# Patient Record
Sex: MALE | Race: WHITE
[De-identification: names, ages, dates, MRNs, and addresses within clinical notes are randomized per-mention and may not be internally consistent; named-entity substitution may affect disease eponyms.]

---

## 2017-05-02 ENCOUNTER — HOSPITAL ENCOUNTER (OUTPATIENT)
Dept: HOSPITAL 47 - OR | Age: 62
Discharge: HOME | End: 2017-05-02
Payer: COMMERCIAL

## 2017-05-02 VITALS — SYSTOLIC BLOOD PRESSURE: 138 MMHG | DIASTOLIC BLOOD PRESSURE: 80 MMHG

## 2017-05-02 VITALS — BODY MASS INDEX: 27.5 KG/M2

## 2017-05-02 VITALS — HEART RATE: 64 BPM | RESPIRATION RATE: 16 BRPM

## 2017-05-02 VITALS — TEMPERATURE: 98.9 F

## 2017-05-02 DIAGNOSIS — F17.200: ICD-10-CM

## 2017-05-02 DIAGNOSIS — H26.9: Primary | ICD-10-CM

## 2017-05-02 DIAGNOSIS — Z95.5: ICD-10-CM

## 2017-05-02 DIAGNOSIS — Z88.0: ICD-10-CM

## 2017-05-02 DIAGNOSIS — I25.10: ICD-10-CM

## 2017-05-02 PROCEDURE — 66984 XCAPSL CTRC RMVL W/O ECP: CPT

## 2017-05-02 RX ADMIN — CYCLOPENTOLATE HYDROCHLORIDE ONE DROPS: 10 SOLUTION/ DROPS OPHTHALMIC at 12:32

## 2017-05-02 RX ADMIN — PHENYLEPHRINE HYDROCHLORIDE ONE DROPS: 100 SOLUTION/ DROPS OPHTHALMIC at 12:29

## 2017-05-02 RX ADMIN — CYCLOPENTOLATE HYDROCHLORIDE ONE DROPS: 10 SOLUTION/ DROPS OPHTHALMIC at 12:41

## 2017-05-02 RX ADMIN — CYCLOPENTOLATE HYDROCHLORIDE ONE DROPS: 10 SOLUTION/ DROPS OPHTHALMIC at 12:23

## 2017-05-02 RX ADMIN — PHENYLEPHRINE HYDROCHLORIDE ONE DROPS: 100 SOLUTION/ DROPS OPHTHALMIC at 12:38

## 2017-05-02 RX ADMIN — FLURBIPROFEN SODIUM ONE DROPS: 0.3 SOLUTION/ DROPS OPHTHALMIC at 12:35

## 2017-05-02 RX ADMIN — FLURBIPROFEN SODIUM ONE DROPS: 0.3 SOLUTION/ DROPS OPHTHALMIC at 12:26

## 2017-05-02 RX ADMIN — FLURBIPROFEN SODIUM ONE DROPS: 0.3 SOLUTION/ DROPS OPHTHALMIC at 12:17

## 2017-05-02 RX ADMIN — PHENYLEPHRINE HYDROCHLORIDE ONE DROPS: 100 SOLUTION/ DROPS OPHTHALMIC at 12:20

## 2017-05-02 NOTE — P.OP
Date of Procedure: 05/02/17


Procedure(s) Performed: 


PREOPERATIVE DIAGNOSIS:  Cataract, right eye.


POSTOPERATIVE DIAGNOSIS: Cataract, right eye.


OPERATION:     Phacoemulsification cataract, right eye. 





DESCRIPTION OF PROCEDURE:  The patient was taken to the preoperative holding 

area.  Intravenous Propofol was given so as to bring about adequate sedation.  

The following mixture was given for local anesthesia: 5 mL of 2% lidocaine, 5 

mL of 0.75% Marcaine, and 1 mL of Wydase.   Approximately 4 mL was injected in 

the retrobulbar space of the surgical eye.  Additional 1 mL was then directed 

to the temporal area of the surgical eye.  This was performed to allow adequate 

neurological block of the facial muscles.   The patient was revived and then 

taken into the operative room.  The patient was prepped and draped in the usual 

sterile manner for the operative eye.  A lid speculum was put into position.  

The conjunctiva was resected back from the limbus in the 12 o'clock position.  

Bleeding was controlled with electrocautery.  A #69 blade was then used and a 

half-thickness scleral incision approximately 1-mm posterior to the limbus was 

made on bare sclera.  This was shelved in the clear cornea using a crescent 

knife.  Next a 15-degree blade was used to make a stab incision at the 3 o'

clock position at the corneolimbal interface.   Keratome blade was then used 

and the superior wound was extended into the anterior chamber.  Viscoelastic 

was injected into the anterior chamber and to maintain its form. Next, a 

cystotome was used and a continuous anterior capsulotomy was made without 

difficulty.   Hydrodissection using a blunt cannula and BSS was performed.  

Phaco probe was then employed and a groove extending from 12 to 6 o'clock in 

the lens was created. A Gary wand was used through the stab incision so as to 

perform a divide and conquer technique.   Next an irrigation aspiration probe 

was utilized and any residual cortex was removed from the eye.   Again, 

viscoelastic was injected into the anterior chamber.   An Luigi posterior 

chamber lens implant was placed in the cartridge and injected into the anterior 

chamber without difficulty.  The Sinskey hook was utilized to spin the lens 

into position and this was again performed without any difficulty.  The 

irrigation and aspiration probe was again employed and any residual 

viscoelastic was removed from the eye.  Then BSS was injected into the limbal 

stab incision and the anterior chamber re-inflated.   The conjunctiva was 

reapproximated using electrocautery.  One drop of 0.25% Timoptic was placed 

over the corneal along with TobraDex ophthalmic ointment.   Two sterile patches 

and a Castrejon eye shield were taped into position.  The patient was transported to 

the recovery room in stable condition.  





Pathology: none sent


Condition: stable


Disposition: same day

## 2017-06-06 ENCOUNTER — HOSPITAL ENCOUNTER (OUTPATIENT)
Dept: HOSPITAL 47 - OR | Age: 62
Discharge: HOME | End: 2017-06-06
Payer: COMMERCIAL

## 2017-06-06 VITALS — SYSTOLIC BLOOD PRESSURE: 122 MMHG | HEART RATE: 67 BPM | DIASTOLIC BLOOD PRESSURE: 78 MMHG

## 2017-06-06 VITALS — TEMPERATURE: 97.6 F | RESPIRATION RATE: 18 BRPM

## 2017-06-06 VITALS — BODY MASS INDEX: 25.5 KG/M2

## 2017-06-06 DIAGNOSIS — Z88.0: ICD-10-CM

## 2017-06-06 DIAGNOSIS — I25.2: ICD-10-CM

## 2017-06-06 DIAGNOSIS — Z87.442: ICD-10-CM

## 2017-06-06 DIAGNOSIS — H25.12: Primary | ICD-10-CM

## 2017-06-06 DIAGNOSIS — Z79.82: ICD-10-CM

## 2017-06-06 DIAGNOSIS — F17.200: ICD-10-CM

## 2017-06-06 PROCEDURE — 66984 XCAPSL CTRC RMVL W/O ECP: CPT

## 2017-06-06 RX ADMIN — PHENYLEPHRINE HYDROCHLORIDE ONE DROPS: 100 SOLUTION/ DROPS OPHTHALMIC at 13:39

## 2017-06-06 RX ADMIN — CYCLOPENTOLATE HYDROCHLORIDE ONE DROPS: 10 SOLUTION/ DROPS OPHTHALMIC at 13:33

## 2017-06-06 RX ADMIN — CYCLOPENTOLATE HYDROCHLORIDE ONE DROPS: 10 SOLUTION/ DROPS OPHTHALMIC at 13:42

## 2017-06-06 RX ADMIN — FLURBIPROFEN SODIUM ONE DROPS: 0.3 SOLUTION/ DROPS OPHTHALMIC at 13:36

## 2017-06-06 RX ADMIN — FLURBIPROFEN SODIUM ONE DROPS: 0.3 SOLUTION/ DROPS OPHTHALMIC at 13:26

## 2017-06-06 RX ADMIN — PHENYLEPHRINE HYDROCHLORIDE ONE DROPS: 100 SOLUTION/ DROPS OPHTHALMIC at 13:30

## 2017-06-06 RX ADMIN — FLURBIPROFEN SODIUM ONE DROPS: 0.3 SOLUTION/ DROPS OPHTHALMIC at 13:45

## 2017-06-06 RX ADMIN — CYCLOPENTOLATE HYDROCHLORIDE ONE DROPS: 10 SOLUTION/ DROPS OPHTHALMIC at 13:22

## 2017-06-06 RX ADMIN — PHENYLEPHRINE HYDROCHLORIDE ONE DROPS: 100 SOLUTION/ DROPS OPHTHALMIC at 13:18

## 2017-06-06 NOTE — P.OP
Date of Procedure: 06/06/17


Preoperative Diagnosis: 





Postoperative Diagnosis: 





Procedure(s) Performed: 


PREOPERATIVE DIAGNOSIS:  Cataract, left eye.


POSTOPERATIVE DIAGNOSIS: Cataract, left eye.


OPERATION:     Phacoemulsification cataract, left eye. 





DESCRIPTION OF PROCEDURE:  The patient was taken to the preoperative holding 

area.  Intravenous Propofol was given so as to bring about adequate sedation.  

The following mixture was given for local anesthesia: 5 mL of 2% lidocaine, 5 

mL of 0.75% Marcaine, and 1 mL of Wydase.   Approximately 4 mL was injected in 

the retrobulbar space of the surgical eye.  Additional 1 mL was then directed 

to the temporal area of the surgical eye.  This was performed to allow adequate 

neurological block of the facial muscles.   The patient was revived and then 

taken into the operative room.  The patient was prepped and draped in the usual 

sterile manner for the operative eye.  A lid speculum was put into position.  

The conjunctiva was resected back from the limbus in the 12 o'clock position.  

Bleeding was controlled with electrocautery.  A #69 blade was then used and a 

half-thickness scleral incision approximately 1-mm posterior to the limbus was 

made on bare sclera.  This was shelved in the clear cornea using a crescent 

knife.  Next a 15-degree blade was used to make a stab incision at the 3 o'

clock position at the corneolimbal interface.   Keratome blade was then used 

and the superior wound was extended into the anterior chamber.  Viscoelastic 

was injected into the anterior chamber and to maintain its form. Next, a 

cystotome was used and a continuous anterior capsulotomy was made without 

difficulty.   Hydrodissection using a blunt cannula and BSS was performed.  

Phaco probe was then employed and a groove extending from 12 to 6 o'clock in 

the lens was created. A Gary wand was used through the stab incision so as to 

perform a divide and conquer technique.   Next an irrigation aspiration probe 

was utilized and any residual cortex was removed from the eye.   Again, 

viscoelastic was injected into the anterior chamber.   An Luigi posterior 

chamber lens implant was placed in the cartridge and injected into the anterior 

chamber without difficulty.  The Sprinklrey hook was utilized to spin the lens 

into position and this was again performed without any difficulty.  The 

irrigation and aspiration probe was again employed and any residual 

viscoelastic was removed from the eye.  Then BSS was injected into the limbal 

stab incision and the anterior chamber re-inflated.   The conjunctiva was 

reapproximated using electrocautery.  One drop of 0.25% Timoptic was placed 

over the corneal along with TobraDex ophthalmic ointment.   Two sterile patches 

and a Castrejon eye shield were taped into position.  The patient was transported to 

the recovery room in stable condition.  





Implants: 





Pathology: none sent


Condition: stable


Disposition: same day


Indications for Procedure: 





Operative Findings: 





Description of Procedure:

## 2017-12-29 ENCOUNTER — HOSPITAL ENCOUNTER (EMERGENCY)
Dept: HOSPITAL 47 - EC | Age: 62
Discharge: HOME | End: 2017-12-29
Payer: COMMERCIAL

## 2017-12-29 VITALS
HEART RATE: 77 BPM | TEMPERATURE: 97.6 F | RESPIRATION RATE: 16 BRPM | SYSTOLIC BLOOD PRESSURE: 128 MMHG | DIASTOLIC BLOOD PRESSURE: 68 MMHG

## 2017-12-29 DIAGNOSIS — R20.2: ICD-10-CM

## 2017-12-29 DIAGNOSIS — F17.200: ICD-10-CM

## 2017-12-29 DIAGNOSIS — R20.0: ICD-10-CM

## 2017-12-29 DIAGNOSIS — Z95.818: ICD-10-CM

## 2017-12-29 DIAGNOSIS — R12: ICD-10-CM

## 2017-12-29 DIAGNOSIS — R05: ICD-10-CM

## 2017-12-29 DIAGNOSIS — M54.2: ICD-10-CM

## 2017-12-29 DIAGNOSIS — M25.512: Primary | ICD-10-CM

## 2017-12-29 DIAGNOSIS — Z88.0: ICD-10-CM

## 2017-12-29 DIAGNOSIS — M47.812: ICD-10-CM

## 2017-12-29 PROCEDURE — 71020: CPT

## 2017-12-29 PROCEDURE — 99283 EMERGENCY DEPT VISIT LOW MDM: CPT

## 2017-12-29 PROCEDURE — 72050 X-RAY EXAM NECK SPINE 4/5VWS: CPT

## 2017-12-29 PROCEDURE — 93005 ELECTROCARDIOGRAM TRACING: CPT

## 2017-12-29 NOTE — XR
EXAMINATION TYPE: XR cervical spine comp

 

DATE OF EXAM: 12/29/2017

 

TECHNIQUE: Frontal, lateral, oblique, and open mouth view of the cervical spine are obtained.

 

HISTORY:  Pain

 

COMPARISON: None

 

FINDINGS:  The cervical spine is visualized from C1 thru the mid to inferior C7 level, it is straight
ened in alignment without evidence of acute fracture or dislocation. There is suboptimal evaluation o
f C7-T1 level without dedicated swimmer's view.  The pre-vertebral soft tissue appears within normal 
limits.  The C1-C2 articulation is within normal limits on the open mouth view. 

 

Vertebral body heights are maintained. There is mild disc space narrowing and spurring C4-C5 level. T
here is more moderate disc space narrowing with mild spurring C5-C6 level. There is mild to moderate 
disc space narrowing C6-C7 level. The oblique images are within normal limits. Overlying soft tissue 
is unremarkable.

 

IMPRESSION: Straightening of cervical spine with multilevel degenerative changes mid to lower cervica
l levels noted as detailed above.

## 2017-12-29 NOTE — XR
EXAMINATION TYPE: XR chest 2V

 

DATE OF EXAM: 12/29/2017

 

COMPARISON: 10/26/2015

 

HISTORY: Left shoulder and neck pain.

 

TECHNIQUE:  Frontal and lateral views of the chest are obtained.

 

FINDINGS:  There is no focal air space opacity, pleural effusion, or pneumothorax seen.  The cardiac 
silhouette size is within normal limits.   The osseous structures are intact.

 

IMPRESSION:  No acute cardiopulmonary process.

## 2017-12-29 NOTE — XR
EXAMINATION TYPE: XR shoulder complete LT

 

DATE OF EXAM: 12/29/2017

 

CLINICAL HISTORY: Left shoulder pain

 

TECHNIQUE:  Three views of the left shoulder are obtained.

 

COMPARISON: None. 

 

FINDINGS:  There is no acute fracture/dislocation evident in the left shoulder.  The acromioclavicula
r and glenohumeral joint spaces appear within normal limits.  The visualized ribs are intact and unre
markable.

 

IMPRESSION: Unremarkable study.

## 2017-12-29 NOTE — ED
General Adult HPI





- General


Chief complaint: Extremity Injury, Upper


Stated complaint: Shoulder tear


Time Seen by Provider: 12/29/17 11:27


Source: patient, RN notes reviewed


Mode of arrival: ambulatory


Limitations: no limitations





- History of Present Illness


Initial comments: 





Patient is a 62-year-old male who presents emergency room today with a chief 

complaint of pain to the left shoulder.  He does admit that he went to 

chiropractor advised them they may be something torn in the joint and should 

come here.  Patient states that started approximately a month ago has been a 

constant pain.  He does not that it's worse with movements of extension and 

rotation at times.  Also admits to pain when he rotates his neck to the right.  

Does not that he's had some numbness and tingling sensation down into the left 

arm at times.  Patient also admits that his had a burning sensation in his 

chest when he goes outside in the [place] under 30F.  States it goes away 

soon as he comes back in the house.  He does admit that he had cough congestion 

approximate month ago.  He denies any other complaints or symptoms at this 

time.  Denies any chest pain. Patient denies any recent fever, chills, 

shortness of breath, chest pain, back pain, abdominal pain, nausea or vomiting, 

dysuria or hematuria, constipation or diarrhea, headaches or visual changes, or 

any other complaints.





- Related Data


 Home Medications











 Medication  Instructions  Recorded  Confirmed


 


Ibuprofen [Motrin] 800 mg PO Q6HR PRN 12/29/17 12/29/17








 Previous Rx's











 Medication  Instructions  Recorded


 


Hydrocodone/Acetaminophen [Norco 1 each PO Q6HR PRN #12 tab 12/29/17





5-325]  


 


Ibuprofen [Motrin] 800 mg PO Q6HR #30 tab 12/29/17











 Allergies











Allergy/AdvReac Type Severity Reaction Status Date / Time


 


amoxicillin Allergy  Swelling Verified 12/29/17 11:09














Review of Systems


ROS Statement: 


Those systems with pertinent positive or pertinent negative responses have been 

documented in the HPI.





ROS Other: All systems not noted in ROS Statement are negative.





Past Medical History


Past Medical History: GERD/Reflux, Myocardial Infarction (MI)


Additional Past Medical History / Comment(s): Hx. of kidney stone.


Last Myocardial Infarction Date:: 2012


History of Any Multi-Drug Resistant Organisms: None Reported


Past Surgical History: Heart Catheterization With Stent


Additional Past Surgical History / Comment(s): rt cataract surgery


Past Anesthesia/Blood Transfusion Reactions: No Reported Reaction


Date of Last Stent Placement:: 2012


Past Psychological History: No Psychological Hx Reported


Smoking Status: Current every day smoker


Past Alcohol Use History: Occasional


Past Drug Use History: None Reported





- Past Family History


  ** Mother


Family Medical History: No Reported History





  ** Father


Family Medical History: Cancer


Additional Family Medical History / Comment(s): Lung





General Exam





- General Exam Comments


Initial Comments: 





General:  The patient is awake and alert, in no distress, and does not appear 

acutely ill. 


Eye:  Pupils are equal, round and reactive to light, extra-ocular movements are 

intact.  No nystagmus.  There is normal conjunctiva bilaterally.  No signs of 

icterus.  


Ears, nose, mouth and throat:  There are moist mucous membranes and no oral 

lesions. 


Neck:  The neck is supple, there is no tenderness or JVD.  


Cardiovascular:  There is a regular rate and rhythm. No murmur, rub or gallop 

is appreciated.


Respiratory:  Lungs are clear to auscultation, respirations are non-labored, 

breath sounds are equal.  No wheezes, stridor, rales, or rhonchi.


Musculoskeletal:  Normal ROM. .  Reproduced with rotation of his head to the 

right.  Mild tenderness to the paravertebral areas of the cervical spine on the 

left side.  Patient does have no bony tenderness to the right shoulder cervical

, thoracic or lumbar spine.  Strength 5/5. Sensation intact. Pulses equal 

bilaterally 2+.  


Neurological:  A&O x 3. CN II-XII intact, There are no obvious motor or sensory 

deficits. Coordination appears grossly intact. Speech is normal.


Skin:  Skin is warm and dry and no rashes or lesions are noted. 


Psychiatric:  Cooperative, appropriate mood & affect, normal judgment.  


Limitations: no limitations





Course


 Vital Signs











  12/29/17





  10:51


 


Temperature 97.4 F L


 


Pulse Rate 79


 


Respiratory 18





Rate 


 


Blood Pressure 134/84


 


O2 Sat by Pulse 99





Oximetry 














EKG Findings





- EKG Comments:


EKG Findings:: EKG performed at 1213: Shows evidence for right bundle branch 

block.  Shows normal sinus rhythm at 74 bpm IN interval 172.  .  QT/QTc 

is 406/450.  Her to previous EKG on 08/24/2013.





Medical Decision Making





- Medical Decision Making





Case discussed in detail with attending physician Dr. Alvarez.  Patient's EKG 

shows no acute change from previous.  X-rays have been reviewed and does show 

degenerative changes of cervical spine.  Patient's x-ray of the left shoulder 

and chest are negative.  Results were discussed with patient.  Patient has.  

Continue anti-inflammatories will be given a short prescription of pain 

medication use at night to help him sleep.  He is advised follow-up the family 

doctor and also a orthopedic for further evaluation.





Disposition


Clinical Impression: 


 Shoulder pain, Neck pain





Disposition: HOME SELF-CARE


Condition: Good


Instructions:  Cervical Radiculopathy (ED)


Additional Instructions: 


Please use medication as prescribed aware that the Norco may make you drowsy or 

cause constipation.  Please do not drive while taking this medication.  Please 

follow-up with orthopedics/family doctor in the next 2 days of symptoms have 

not improved.  Please return to emergency room if the symptoms increase or 

worsen or for any other concerns.


Prescriptions: 


Hydrocodone/Acetaminophen [Norco 5-325] 1 each PO Q6HR PRN #12 tab


 PRN Reason: Pain


Ibuprofen [Motrin] 800 mg PO Q6HR #30 tab


Referrals: 


None,Stated [Primary Care Provider] - 1-2 days


Ketty Chaney MD [STAFF PHYSICIAN] - 1-2 days


Mynor Nova MD [STAFF PHYSICIAN] - 1-2 days


Time of Disposition: 12:55

## 2018-02-01 ENCOUNTER — HOSPITAL ENCOUNTER (OUTPATIENT)
Dept: HOSPITAL 47 - RADMRIMAIN | Age: 63
Discharge: HOME | End: 2018-02-01
Payer: COMMERCIAL

## 2018-02-01 DIAGNOSIS — M43.12: ICD-10-CM

## 2018-02-01 DIAGNOSIS — M47.22: ICD-10-CM

## 2018-02-01 DIAGNOSIS — M50.13: ICD-10-CM

## 2018-02-01 DIAGNOSIS — M99.71: Primary | ICD-10-CM

## 2018-02-01 PROCEDURE — 72141 MRI NECK SPINE W/O DYE: CPT

## 2018-02-01 NOTE — MR
MRI CERVICAL SPINE:

 

CLINICAL HISTORY: Spondylosis without myelopathy or radiculopathy, cervical radiculopathy, cervical t
horacic scoliosis, and cervical spondylolisthesis all per order. Headache with neck pain causing left
 arm soreness for 2 months per patient.

 

TECHNIQUE: Multiplanar, multisequence imaging of the cervical spine is performed without IV contrast.


 

COMPARISON: Cervical spine x-ray December 29, 2017.

 

FINDINGS: Sagittal images of the cervical spine show the craniocervical junction to appear within nor
mal limits.  The cervical and upper thoracic spinal cord is normal in caliber and signal. There is sl
ight grade 1 retrolisthesis of C5 on C6. There is mild to moderate disc space narrowing C5-C6 level. 
 The vertebral body and intravertebral disk heights otherwise are normal. There is heterogeneous endp
late changes posterior C5-C6 level. No large posterior disc herniations are seen on sagittal images. 
No significant spurring is seen.

 

Axial images show the C2-C3, C3-C4, and C4-C5 levels all to appear within normal limits.

 

Axial images at C5-C6 levels show broad disc protrusion with right paracentral component effacing the
 anterior thecal sac and nearly up to ventral surface of spinal cord and causing moderate to advanced
 bilateral neural foraminal narrowing.

 

Axial images at C6-C7 level with broad-based posterior disc protrusion mildly effaces anterior thecal
 sac and causing advanced left and moderate right-sided neural foraminal narrowing.

 

Axial images at C7-T1 level shows small central disc protrusion mildly facing anterior thecal sac, bi
lateral neural foramina are patent.

 

IMPRESSION: Grade 1 retrolisthesis of C5 on C6. Degenerative changes with disc herniations at C5-C6 a
nd C6-C7 level causing bilateral neural foraminal narrowing. Disc herniation causes most prominent sp
inal canal effacement C5-C6 level.

## 2018-08-05 ENCOUNTER — HOSPITAL ENCOUNTER (EMERGENCY)
Dept: HOSPITAL 47 - EC | Age: 63
Discharge: HOME | End: 2018-08-05
Payer: COMMERCIAL

## 2018-08-05 VITALS — RESPIRATION RATE: 18 BRPM

## 2018-08-05 VITALS — DIASTOLIC BLOOD PRESSURE: 79 MMHG | TEMPERATURE: 98.5 F | SYSTOLIC BLOOD PRESSURE: 148 MMHG | HEART RATE: 58 BPM

## 2018-08-05 DIAGNOSIS — S80.02XA: Primary | ICD-10-CM

## 2018-08-05 DIAGNOSIS — M54.12: ICD-10-CM

## 2018-08-05 DIAGNOSIS — I25.2: ICD-10-CM

## 2018-08-05 DIAGNOSIS — Z95.5: ICD-10-CM

## 2018-08-05 DIAGNOSIS — Z79.84: ICD-10-CM

## 2018-08-05 DIAGNOSIS — I45.10: ICD-10-CM

## 2018-08-05 DIAGNOSIS — I25.10: ICD-10-CM

## 2018-08-05 DIAGNOSIS — R20.0: ICD-10-CM

## 2018-08-05 DIAGNOSIS — F17.200: ICD-10-CM
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
## 2018-08-05 DIAGNOSIS — K21.9: ICD-10-CM

## 2018-08-05 DIAGNOSIS — Z98.890: ICD-10-CM

## 2018-08-05 DIAGNOSIS — Z88.0: ICD-10-CM

## 2018-08-05 DIAGNOSIS — Z79.899: ICD-10-CM

## 2018-08-05 LAB
ALBUMIN SERPL-MCNC: 4.1 G/DL (ref 3.5–5)
ALP SERPL-CCNC: 112 U/L (ref 38–126)
ALT SERPL-CCNC: 54 U/L (ref 21–72)
ANION GAP SERPL CALC-SCNC: 10 MMOL/L
APTT BLD: 23.1 SEC (ref 22–30)
AST SERPL-CCNC: 28 U/L (ref 17–59)
BASOPHILS # BLD AUTO: 0.1 K/UL (ref 0–0.2)
BASOPHILS NFR BLD AUTO: 1 %
BUN SERPL-SCNC: 10 MG/DL (ref 9–20)
CALCIUM SPEC-MCNC: 9 MG/DL (ref 8.4–10.2)
CHLORIDE SERPL-SCNC: 111 MMOL/L (ref 98–107)
CK SERPL-CCNC: 95 U/L (ref 55–170)
CO2 SERPL-SCNC: 21 MMOL/L (ref 22–30)
EOSINOPHIL # BLD AUTO: 0.2 K/UL (ref 0–0.7)
EOSINOPHIL NFR BLD AUTO: 2 %
ERYTHROCYTE [DISTWIDTH] IN BLOOD BY AUTOMATED COUNT: 4.21 M/UL (ref 4.3–5.9)
ERYTHROCYTE [DISTWIDTH] IN BLOOD: 13.5 % (ref 11.5–15.5)
GLUCOSE SERPL-MCNC: 118 MG/DL (ref 74–99)
HCT VFR BLD AUTO: 39.8 % (ref 39–53)
HGB BLD-MCNC: 13.1 GM/DL (ref 13–17.5)
INR PPP: 1.1 (ref ?–1.2)
LYMPHOCYTES # SPEC AUTO: 2.5 K/UL (ref 1–4.8)
LYMPHOCYTES NFR SPEC AUTO: 29 %
MAGNESIUM SPEC-SCNC: 1.9 MG/DL (ref 1.6–2.3)
MCH RBC QN AUTO: 31.2 PG (ref 25–35)
MCHC RBC AUTO-ENTMCNC: 33 G/DL (ref 31–37)
MCV RBC AUTO: 94.4 FL (ref 80–100)
MONOCYTES # BLD AUTO: 0.6 K/UL (ref 0–1)
MONOCYTES NFR BLD AUTO: 7 %
NEUTROPHILS # BLD AUTO: 5 K/UL (ref 1.3–7.7)
NEUTROPHILS NFR BLD AUTO: 58 %
PLATELET # BLD AUTO: 206 K/UL (ref 150–450)
POTASSIUM SERPL-SCNC: 4.2 MMOL/L (ref 3.5–5.1)
PROT SERPL-MCNC: 6.6 G/DL (ref 6.3–8.2)
PT BLD: 10.4 SEC (ref 9–12)
SODIUM SERPL-SCNC: 142 MMOL/L (ref 137–145)
TROPONIN I SERPL-MCNC: <0.012 NG/ML (ref 0–0.03)
WBC # BLD AUTO: 8.6 K/UL (ref 3.8–10.6)

## 2018-08-05 PROCEDURE — 99285 EMERGENCY DEPT VISIT HI MDM: CPT

## 2018-08-05 PROCEDURE — 83735 ASSAY OF MAGNESIUM: CPT

## 2018-08-05 PROCEDURE — 71046 X-RAY EXAM CHEST 2 VIEWS: CPT

## 2018-08-05 PROCEDURE — 83880 ASSAY OF NATRIURETIC PEPTIDE: CPT

## 2018-08-05 PROCEDURE — 36415 COLL VENOUS BLD VENIPUNCTURE: CPT

## 2018-08-05 PROCEDURE — 82550 ASSAY OF CK (CPK): CPT

## 2018-08-05 PROCEDURE — 93005 ELECTROCARDIOGRAM TRACING: CPT

## 2018-08-05 PROCEDURE — 85730 THROMBOPLASTIN TIME PARTIAL: CPT

## 2018-08-05 PROCEDURE — 72125 CT NECK SPINE W/O DYE: CPT

## 2018-08-05 PROCEDURE — 85025 COMPLETE CBC W/AUTO DIFF WBC: CPT

## 2018-08-05 PROCEDURE — 80053 COMPREHEN METABOLIC PANEL: CPT

## 2018-08-05 PROCEDURE — 85610 PROTHROMBIN TIME: CPT

## 2018-08-05 PROCEDURE — 82553 CREATINE MB FRACTION: CPT

## 2018-08-05 PROCEDURE — 84484 ASSAY OF TROPONIN QUANT: CPT

## 2018-08-05 PROCEDURE — 70450 CT HEAD/BRAIN W/O DYE: CPT

## 2018-08-05 NOTE — US
EXAMINATION TYPE: US venous doppler duplex LE LT

 

DATE OF EXAM: 8/5/2018 1:59 PM

 

COMPARISON: NONE

 

CLINICAL HISTORY: 63-year-old male swelling and Pain.

 

SIDE PERFORMED: Left  

 

TECHNIQUE:  The lower extremity deep venous system is examined utilizing real time linear array sonog
gu[place] with graded compression, doppler sonography and color-flow sonography.

 

FINDINGS:

 

VESSELS IMAGED:

External Iliac Vein (EIV)

Common Femoral Vein

Deep Femoral Vein

Greater Saphenous Vein *

Femoral Vein

Popliteal Vein

Small Saphenous Vein *

Proximal Calf Veins

(* superficial vessels)

 

 

 

 

 

Left Leg:  Negative for DVT

 

 

 

IMPRESSION:

No evidence for DVT within the left lower extremity imaged from the groin to the upper calf.

## 2018-08-05 NOTE — XR
EXAMINATION TYPE: XR knee complete LT

 

DATE OF EXAM: 8/5/2018

 

COMPARISON: NONE

 

HISTORY: 63-year-old male left knee pain

 

TECHNIQUE: 3 views

 

FINDINGS: 

Extensor mechanism is intact. Trace, physiologic joint fluid. No acute fracture, subluxation, or disl
ocation. Mild anterior soft tissue swelling and

 

IMPRESSION: 

Mild anterior soft tissue swelling. No acute osseous abnormality seen.

## 2018-08-05 NOTE — CT
EXAMINATION TYPE: CT brain aleine wo con

 

DATE OF EXAM: 8/5/2018

 

COMPARISON: Previous MRI of the cervical spine dated 2/1/2018

 

HISTORY: Neck and Arm Pain, Radiculopathy from neck

 

CT DLP: 1349.90 mGycm

Automated exposure control for dose reduction was used.

 

TECHNIQUE: CT scan of the head and cervical spine are performed without contrast.

 

FINDINGS:  

BRAIN: There are mild, generalized changes of sulcal prominence and ventriculomegaly, compatible with
 atrophic change. There is mild, diffuse periventricular white matter lucency, compatible with chroni
c small vessel ischemic change. There is no acute focal lesion, mass effect or midline shift identifi
ed. I do not see evidence of intracranial blood.

 

There is an air-fluid level in the left maxillary sinus. The remainder the paranasal sinuses and mast
oids are clear. The bony calvarium is intact.

 

IMPRESSION:

1. NO ACUTE INTRACRANIAL ABNORMALITY.

2. DEGENERATIVE CHANGE.

3. ACUTE ON CHRONIC LEFT MAXILLARY SINUSITIS. CERVICAL SPINE: There are mild emphysematous changes wi
thin the lungs.

 

Prevertebral soft tissues are otherwise unremarkable.

 

There is a mild retrograde listhesis of C5 on C6. Alignment is otherwise maintained. Atlantoaxial rel
ationships are normal.

 

At C2-3, there is a diffuse disc displacement. Intervertebral foramina are widely maintained. The fac
et and uncovertebral joints are unremarkable.

 

At C3-4, there is a diffuse disc displacement. Intervertebral foramina are well maintained. The facet
 and uncovertebral joints are unremarkable.

 

C4-5, the intervertebral foramina are well maintained. There is a diffuse disc displacement. The face
t and uncovertebral joints are unremarkable.

 

At C5-6, there is bilateral intervertebral foraminal narrowing. There is disc space loss and hypertro
phic spondylosis both anteriorly and posteriorly. The facets are unremarkable. There is uncovertebral
 joint disease.

 

At C6-7, there is disc space loss and hypertrophic spondylosis both anteriorly and posteriorly. There
 is bilateral intervertebral foraminal narrowing. There is uncovertebral joint disease. The facets ar
e unremarkable.

 

At C7-T1, no definite abnormality is seen.

 

IMPRESSION:

1. DEGENERATIVE DISC DISEASE AND HYPERTROPHIC SPONDYLOSIS MOST MARKED AT C5-6 AND C6-7.

2. DIFFUSE DEGENERATIVE DISC DISEASE.

3. MULTILEVEL INTERVERTEBRAL FORAMINAL NARROWING.

## 2018-08-05 NOTE — ED
General Adult HPI





- General


Chief complaint: Neuro Symptoms/Deficit


Stated complaint: SOB


Time Seen by Provider: 08/05/18 13:08


Source: patient, EMS, RN notes reviewed


Mode of arrival: EMS


Limitations: no limitations





- History of Present Illness


Initial comments: 





63-year-old male presenting with left arm and shoulder pain as well as left 

knee pain and left lower extremity swelling.  Patient has history of CAD.  It 

was felt by EMS that his left shoulder pain may be cardiac in nature, he was 

given morphine and nitroglycerin prior to arrival.  He denies any chest pain or 

dyspnea.  He states that his neck and shoulder pain is been present for the 

past 6 months or greater.  Approximately 2 days ago patient did have a fall and 

he is developed some left knee pain.  He also reports left lower extremity 

swelling which is chronic in nature.  His primary care physician is aware of 

this swelling.  Patient denies fever or chills.  Denies cough.  Denies 

abdominal pain nausea vomiting.  Denies focal weakness.  He does complain of 

some numbness in the left arm which is also been present for the duration of 

his symptoms.  Patient's wife states he has been drinking quite heavily over 

the past several days and not eating.  She attributes this falls to alcohol 

consumption.  She states he fell onto his left knee twice.





- Related Data


 Home Medications











 Medication  Instructions  Recorded  Confirmed


 


Atorvastatin [Lipitor] 80 mg PO DAILY 08/05/18 08/05/18


 


Ibuprofen [Motrin] 800 mg PO TID PRN 08/05/18 08/05/18


 


Metoprolol Tartrate [Lopressor] 25 mg PO AS DIRECTED 08/05/18 08/05/18


 


Pioglitazone HCl [Actos] 15 mg PO AS DIRECTED 08/05/18 08/05/18


 


metFORMIN HCL [Glucophage] 500 mg PO DAILY 08/05/18 08/05/18











 Allergies











Allergy/AdvReac Type Severity Reaction Status Date / Time


 


amoxicillin Allergy  Swelling Verified 08/05/18 14:30














Review of Systems


ROS Statement: 


Those systems with pertinent positive or pertinent negative responses have been 

documented in the HPI.





ROS Other: All systems not noted in ROS Statement are negative.





Past Medical History


Past Medical History: GERD/Reflux, Myocardial Infarction (MI)


Additional Past Medical History / Comment(s): Hx. of kidney stone.


Last Myocardial Infarction Date:: 2012


History of Any Multi-Drug Resistant Organisms: None Reported


Past Surgical History: Heart Catheterization With Stent


Additional Past Surgical History / Comment(s): rt cataract surgery


Past Anesthesia/Blood Transfusion Reactions: No Reported Reaction


Date of Last Stent Placement:: 2012


Past Psychological History: No Psychological Hx Reported


Smoking Status: Current every day smoker


Past Alcohol Use History: Occasional


Past Drug Use History: None Reported





- Past Family History


  ** Mother


Family Medical History: No Reported History





  ** Father


Family Medical History: Cancer


Additional Family Medical History / Comment(s): Lung





General Exam


Limitations: no limitations


General appearance: alert, in no apparent distress


Head exam: Present: atraumatic, normocephalic


Eye exam: Present: normal appearance, PERRL, EOMI


ENT exam: Present: normal exam, normal oropharynx


Neck exam: Present: normal inspection, tenderness, full ROM


Respiratory exam: Present: normal lung sounds bilaterally.  Absent: respiratory 

distress, wheezes


Cardiovascular Exam: Present: regular rate, normal rhythm


GI/Abdominal exam: Present: soft.  Absent: distended, tenderness


Extremities exam: Present: normal inspection, normal capillary refill.  Absent: 

pedal edema


Back exam: Present: normal inspection, full ROM


Neurological exam: Present: alert, oriented X3, CN II-XII intact.  Absent: 

motor sensory deficit


Psychiatric exam: Present: normal affect, normal mood


Skin exam: Present: warm, dry, intact.  Absent: cyanosis, diaphoretic





Course


 Vital Signs











  08/05/18 08/05/18





  13:09 14:52


 


Temperature 99.5 F 


 


Pulse Rate 88 72


 


Respiratory 20 18





Rate  


 


Blood Pressure 132/75 140/77


 


O2 Sat by Pulse 99 100





Oximetry  














- Reevaluation(s)


Reevaluation #1: 





08/05/18 16:22


Patient is reevaluated, neurologic examination remains nonfocal, he is able to 

stand and bear weight without difficulty in the emergency department.





EKG Findings





- EKG Comments:


EKG Findings:: EKG: Normal sinus rhythm, right bundle branch block, rate of 70, 

FL interval 194, QRS duration 132,  no ST segment elevation or 

depression.





Medical Decision Making





- Medical Decision Making





63 -year-old male presenting for evaluation of chronic left upper extremity 

pain and numbness as well as left knee pain status post fall.  Patient didn't 

receive complete workup in emergency department.  EMS felt that his shoulder 

pain may be cardiac in nature, this was evaluated emergency department, EKG 

showed right bundle branch block, no ST segment changes, troponin is negative 

and as his symptoms have been present for months I doubt any cardiac cause for 

his left arm pain.  Patient does report neck arthritis.  CT is obtained which 

shows severe degenerative disease and foraminal narrowing.  This likely is the 

cause of patient's arm pain and numbness.  Again this is chronic in nature.  

Patient also fell onto his left knee twice, x-ray shows some soft tissue 

swelling without any fracture or dislocation.  Ultrasound is obtained for some 

swelling in the left leg which is negative for DVT.  Patient has follow-up with 

orthopedic surgery, C-spine and general disease.  He has Motrin and Norco at 

home.  He will attempt to abstain from alcohol use.





Patient is informed of all test results, patient wife are agreeable with 

discharge and outpatient follow-up.





- Lab Data


Result diagrams: 


 08/05/18 13:17





 08/05/18 13:17


 Lab Results











  08/05/18 08/05/18 08/05/18 Range/Units





  13:17 13:17 13:17 


 


WBC   8.6   (3.8-10.6)  k/uL


 


RBC   4.21 L   (4.30-5.90)  m/uL


 


Hgb   13.1   (13.0-17.5)  gm/dL


 


Hct   39.8   (39.0-53.0)  %


 


MCV   94.4   (80.0-100.0)  fL


 


MCH   31.2   (25.0-35.0)  pg


 


MCHC   33.0   (31.0-37.0)  g/dL


 


RDW   13.5   (11.5-15.5)  %


 


Plt Count   206   (150-450)  k/uL


 


Neutrophils %   58   %


 


Lymphocytes %   29   %


 


Monocytes %   7   %


 


Eosinophils %   2   %


 


Basophils %   1   %


 


Neutrophils #   5.0   (1.3-7.7)  k/uL


 


Lymphocytes #   2.5   (1.0-4.8)  k/uL


 


Monocytes #   0.6   (0-1.0)  k/uL


 


Eosinophils #   0.2   (0-0.7)  k/uL


 


Basophils #   0.1   (0-0.2)  k/uL


 


PT     (9.0-12.0)  sec


 


INR     (<1.2)  


 


APTT     (22.0-30.0)  sec


 


Sodium    142  (137-145)  mmol/L


 


Potassium    4.2  (3.5-5.1)  mmol/L


 


Chloride    111 H  ()  mmol/L


 


Carbon Dioxide    21 L  (22-30)  mmol/L


 


Anion Gap    10  mmol/L


 


BUN    10  (9-20)  mg/dL


 


Creatinine    0.80  (0.66-1.25)  mg/dL


 


Est GFR (CKD-EPI)AfAm    >90  (>60 ml/min/1.73 sqM)  


 


Est GFR (CKD-EPI)NonAf    >90  (>60 ml/min/1.73 sqM)  


 


Glucose    118 H  (74-99)  mg/dL


 


Calcium    9.0  (8.4-10.2)  mg/dL


 


Magnesium    1.9  (1.6-2.3)  mg/dL


 


Total Bilirubin    0.5  (0.2-1.3)  mg/dL


 


AST    28  (17-59)  U/L


 


ALT    54  (21-72)  U/L


 


Alkaline Phosphatase    112  ()  U/L


 


Total Creatine Kinase  95    ()  U/L


 


CK-MB (CK-2)  1.3    (0.0-2.4)  ng/mL


 


CK-MB (CK-2) Rel Index  1.4    


 


Troponin I  <0.012    (0.000-0.034)  ng/mL


 


NT-Pro-B Natriuret Pep     pg/mL


 


Total Protein    6.6  (6.3-8.2)  g/dL


 


Albumin    4.1  (3.5-5.0)  g/dL














  08/05/18 08/05/18 Range/Units





  13:17 13:17 


 


WBC    (3.8-10.6)  k/uL


 


RBC    (4.30-5.90)  m/uL


 


Hgb    (13.0-17.5)  gm/dL


 


Hct    (39.0-53.0)  %


 


MCV    (80.0-100.0)  fL


 


MCH    (25.0-35.0)  pg


 


MCHC    (31.0-37.0)  g/dL


 


RDW    (11.5-15.5)  %


 


Plt Count    (150-450)  k/uL


 


Neutrophils %    %


 


Lymphocytes %    %


 


Monocytes %    %


 


Eosinophils %    %


 


Basophils %    %


 


Neutrophils #    (1.3-7.7)  k/uL


 


Lymphocytes #    (1.0-4.8)  k/uL


 


Monocytes #    (0-1.0)  k/uL


 


Eosinophils #    (0-0.7)  k/uL


 


Basophils #    (0-0.2)  k/uL


 


PT   10.4  (9.0-12.0)  sec


 


INR   1.1  (<1.2)  


 


APTT   23.1  (22.0-30.0)  sec


 


Sodium    (137-145)  mmol/L


 


Potassium    (3.5-5.1)  mmol/L


 


Chloride    ()  mmol/L


 


Carbon Dioxide    (22-30)  mmol/L


 


Anion Gap    mmol/L


 


BUN    (9-20)  mg/dL


 


Creatinine    (0.66-1.25)  mg/dL


 


Est GFR (CKD-EPI)AfAm    (>60 ml/min/1.73 sqM)  


 


Est GFR (CKD-EPI)NonAf    (>60 ml/min/1.73 sqM)  


 


Glucose    (74-99)  mg/dL


 


Calcium    (8.4-10.2)  mg/dL


 


Magnesium    (1.6-2.3)  mg/dL


 


Total Bilirubin    (0.2-1.3)  mg/dL


 


AST    (17-59)  U/L


 


ALT    (21-72)  U/L


 


Alkaline Phosphatase    ()  U/L


 


Total Creatine Kinase    ()  U/L


 


CK-MB (CK-2)    (0.0-2.4)  ng/mL


 


CK-MB (CK-2) Rel Index    


 


Troponin I    (0.000-0.034)  ng/mL


 


NT-Pro-B Natriuret Pep  274   pg/mL


 


Total Protein    (6.3-8.2)  g/dL


 


Albumin    (3.5-5.0)  g/dL














Disposition


Clinical Impression: 


 Contusion, knee, Cervical radiculopathy





Disposition: HOME SELF-CARE


Condition: Good


Instructions:  Cervical Radiculopathy (ED), Knee Pain (ED)


Is patient prescribed a controlled substance at d/c from ED?: No


Referrals: 


Rik Hurt MD [Primary Care Provider] - 1-2 days


OSORIO Harp DO [Doctor of Osteopathic Medicine] - 1-2 days


Time of Disposition: 16:22

## 2018-08-05 NOTE — XR
EXAMINATION TYPE: XR chest 2V

 

DATE OF EXAM: 8/5/2018

 

HISTORY: Chest Pain.

 

REFERENCE: Previous study dated 12/29/2017.

 

FINDINGS: The lungs are clear. Pleural space are clear. Heart size is normal.

 

IMPRESSION: 

 

NORMAL CHEST.

## 2019-04-05 ENCOUNTER — HOSPITAL ENCOUNTER (INPATIENT)
Dept: HOSPITAL 47 - EC | Age: 64
Discharge: ANOTHER HOSPITAL | DRG: 215 | End: 2019-04-05
Attending: INTERNAL MEDICINE | Admitting: INTERNAL MEDICINE
Payer: COMMERCIAL

## 2019-04-05 VITALS — SYSTOLIC BLOOD PRESSURE: 104 MMHG | HEART RATE: 76 BPM | RESPIRATION RATE: 14 BRPM | DIASTOLIC BLOOD PRESSURE: 72 MMHG

## 2019-04-05 VITALS — TEMPERATURE: 97.7 F

## 2019-04-05 DIAGNOSIS — Z88.0: ICD-10-CM

## 2019-04-05 DIAGNOSIS — K21.9: ICD-10-CM

## 2019-04-05 DIAGNOSIS — I49.01: ICD-10-CM
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
## 2019-04-05 DIAGNOSIS — I45.10: ICD-10-CM

## 2019-04-05 DIAGNOSIS — I25.2: ICD-10-CM

## 2019-04-05 DIAGNOSIS — M50.30: ICD-10-CM

## 2019-04-05 DIAGNOSIS — I11.9: ICD-10-CM

## 2019-04-05 DIAGNOSIS — F17.210: ICD-10-CM

## 2019-04-05 DIAGNOSIS — G47.30: ICD-10-CM

## 2019-04-05 DIAGNOSIS — Z82.49: ICD-10-CM

## 2019-04-05 DIAGNOSIS — Z72.89: ICD-10-CM

## 2019-04-05 DIAGNOSIS — T82.855A: ICD-10-CM

## 2019-04-05 DIAGNOSIS — M19.90: ICD-10-CM

## 2019-04-05 DIAGNOSIS — I46.9: ICD-10-CM

## 2019-04-05 DIAGNOSIS — Z98.41: ICD-10-CM

## 2019-04-05 DIAGNOSIS — I21.4: Primary | ICD-10-CM

## 2019-04-05 DIAGNOSIS — Z79.82: ICD-10-CM

## 2019-04-05 DIAGNOSIS — E11.9: ICD-10-CM

## 2019-04-05 DIAGNOSIS — E78.5: ICD-10-CM

## 2019-04-05 DIAGNOSIS — E87.5: ICD-10-CM

## 2019-04-05 DIAGNOSIS — Z80.1: ICD-10-CM

## 2019-04-05 DIAGNOSIS — I44.2: ICD-10-CM

## 2019-04-05 DIAGNOSIS — Z96.1: ICD-10-CM

## 2019-04-05 DIAGNOSIS — Z87.442: ICD-10-CM

## 2019-04-05 DIAGNOSIS — Z71.41: ICD-10-CM

## 2019-04-05 DIAGNOSIS — Z79.899: ICD-10-CM

## 2019-04-05 DIAGNOSIS — Z91.19: ICD-10-CM

## 2019-04-05 DIAGNOSIS — I25.10: ICD-10-CM

## 2019-04-05 DIAGNOSIS — Z83.3: ICD-10-CM

## 2019-04-05 DIAGNOSIS — R13.10: ICD-10-CM

## 2019-04-05 DIAGNOSIS — Z79.84: ICD-10-CM

## 2019-04-05 DIAGNOSIS — R57.0: ICD-10-CM

## 2019-04-05 LAB
ALBUMIN SERPL-MCNC: 3.7 G/DL (ref 3.5–5)
ALBUMIN SERPL-MCNC: 4.3 G/DL (ref 3.5–5)
ALP SERPL-CCNC: 120 U/L (ref 38–126)
ALP SERPL-CCNC: 133 U/L (ref 38–126)
ALT SERPL-CCNC: 77 U/L (ref 21–72)
ALT SERPL-CCNC: 81 U/L (ref 21–72)
ANION GAP SERPL CALC-SCNC: 13 MMOL/L
ANION GAP SERPL CALC-SCNC: 22 MMOL/L
ANION GAP SERPL CALC-SCNC: 9 MMOL/L
APTT BLD: 22.9 SEC (ref 22–30)
AST SERPL-CCNC: 404 U/L (ref 17–59)
AST SERPL-CCNC: 452 U/L (ref 17–59)
BASOPHILS # BLD AUTO: 0.1 K/UL (ref 0–0.2)
BASOPHILS # BLD AUTO: 0.1 K/UL (ref 0–0.2)
BASOPHILS NFR BLD AUTO: 1 %
BASOPHILS NFR BLD AUTO: 1 %
BUN SERPL-SCNC: 16 MG/DL (ref 9–20)
BUN SERPL-SCNC: 19 MG/DL (ref 9–20)
BUN SERPL-SCNC: 20 MG/DL (ref 9–20)
CALCIUM SPEC-MCNC: 10.5 MG/DL (ref 8.4–10.2)
CALCIUM SPEC-MCNC: 10.7 MG/DL (ref 8.4–10.2)
CALCIUM SPEC-MCNC: 9.7 MG/DL (ref 8.4–10.2)
CHLORIDE SERPL-SCNC: 105 MMOL/L (ref 98–107)
CHLORIDE SERPL-SCNC: 106 MMOL/L (ref 98–107)
CHLORIDE SERPL-SCNC: 108 MMOL/L (ref 98–107)
CO2 SERPL-SCNC: 14 MMOL/L (ref 22–30)
CO2 SERPL-SCNC: 21 MMOL/L (ref 22–30)
CO2 SERPL-SCNC: 22 MMOL/L (ref 22–30)
EOSINOPHIL # BLD AUTO: 0.1 K/UL (ref 0–0.7)
EOSINOPHIL # BLD AUTO: 0.2 K/UL (ref 0–0.7)
EOSINOPHIL NFR BLD AUTO: 1 %
EOSINOPHIL NFR BLD AUTO: 1 %
ERYTHROCYTE [DISTWIDTH] IN BLOOD BY AUTOMATED COUNT: 3.9 M/UL (ref 4.3–5.9)
ERYTHROCYTE [DISTWIDTH] IN BLOOD BY AUTOMATED COUNT: 4.55 M/UL (ref 4.3–5.9)
ERYTHROCYTE [DISTWIDTH] IN BLOOD: 13.6 % (ref 11.5–15.5)
ERYTHROCYTE [DISTWIDTH] IN BLOOD: 14.1 % (ref 11.5–15.5)
GLUCOSE SERPL-MCNC: 161 MG/DL (ref 74–99)
GLUCOSE SERPL-MCNC: 163 MG/DL (ref 74–99)
GLUCOSE SERPL-MCNC: 379 MG/DL (ref 74–99)
HCT VFR BLD AUTO: 39.3 % (ref 39–53)
HCT VFR BLD AUTO: 42.1 % (ref 39–53)
HGB BLD-MCNC: 12.4 GM/DL (ref 13–17.5)
HGB BLD-MCNC: 14.4 GM/DL (ref 13–17.5)
INR PPP: 0.9 (ref ?–1.2)
LYMPHOCYTES # SPEC AUTO: 2.6 K/UL (ref 1–4.8)
LYMPHOCYTES # SPEC AUTO: 4.9 K/UL (ref 1–4.8)
LYMPHOCYTES NFR SPEC AUTO: 17 %
LYMPHOCYTES NFR SPEC AUTO: 28 %
MAGNESIUM SPEC-SCNC: 1.9 MG/DL (ref 1.6–2.3)
MCH RBC QN AUTO: 31.6 PG (ref 25–35)
MCH RBC QN AUTO: 31.7 PG (ref 25–35)
MCHC RBC AUTO-ENTMCNC: 31.4 G/DL (ref 31–37)
MCHC RBC AUTO-ENTMCNC: 34.1 G/DL (ref 31–37)
MCV RBC AUTO: 101 FL (ref 80–100)
MCV RBC AUTO: 92.6 FL (ref 80–100)
MONOCYTES # BLD AUTO: 1.2 K/UL (ref 0–1)
MONOCYTES # BLD AUTO: 1.4 K/UL (ref 0–1)
MONOCYTES NFR BLD AUTO: 8 %
MONOCYTES NFR BLD AUTO: 8 %
NEUTROPHILS # BLD AUTO: 10 K/UL (ref 1.3–7.7)
NEUTROPHILS # BLD AUTO: 11.3 K/UL (ref 1.3–7.7)
NEUTROPHILS NFR BLD AUTO: 58 %
NEUTROPHILS NFR BLD AUTO: 72 %
PLATELET # BLD AUTO: 149 K/UL (ref 150–450)
PLATELET # BLD AUTO: 249 K/UL (ref 150–450)
POTASSIUM SERPL-SCNC: 5 MMOL/L (ref 3.5–5.1)
POTASSIUM SERPL-SCNC: 5.8 MMOL/L (ref 3.5–5.1)
PROT SERPL-MCNC: 6.6 G/DL (ref 6.3–8.2)
PROT SERPL-MCNC: 7.4 G/DL (ref 6.3–8.2)
PT BLD: 9.9 SEC (ref 9–12)
SODIUM SERPL-SCNC: 139 MMOL/L (ref 137–145)
SODIUM SERPL-SCNC: 139 MMOL/L (ref 137–145)
SODIUM SERPL-SCNC: 142 MMOL/L (ref 137–145)
WBC # BLD AUTO: 15.7 K/UL (ref 3.8–10.6)
WBC # BLD AUTO: 17.2 K/UL (ref 3.8–10.6)

## 2019-04-05 PROCEDURE — 85025 COMPLETE CBC W/AUTO DIFF WBC: CPT

## 2019-04-05 PROCEDURE — 71046 X-RAY EXAM CHEST 2 VIEWS: CPT

## 2019-04-05 PROCEDURE — 92950 HEART/LUNG RESUSCITATION CPR: CPT

## 2019-04-05 PROCEDURE — 96375 TX/PRO/DX INJ NEW DRUG ADDON: CPT

## 2019-04-05 PROCEDURE — 80048 BASIC METABOLIC PNL TOTAL CA: CPT

## 2019-04-05 PROCEDURE — 93458 L HRT ARTERY/VENTRICLE ANGIO: CPT

## 2019-04-05 PROCEDURE — B41G1ZZ FLUOROSCOPY OF LEFT LOWER EXTREMITY ARTERIES USING LOW OSMOLAR CONTRAST: ICD-10-PCS

## 2019-04-05 PROCEDURE — 36415 COLL VENOUS BLD VENIPUNCTURE: CPT

## 2019-04-05 PROCEDURE — 5A0221D ASSISTANCE WITH CARDIAC OUTPUT USING IMPELLER PUMP, CONTINUOUS: ICD-10-PCS

## 2019-04-05 PROCEDURE — 5A1223Z PERFORMANCE OF CARDIAC PACING, CONTINUOUS: ICD-10-PCS

## 2019-04-05 PROCEDURE — 93005 ELECTROCARDIOGRAM TRACING: CPT

## 2019-04-05 PROCEDURE — 99291 CRITICAL CARE FIRST HOUR: CPT

## 2019-04-05 PROCEDURE — 80053 COMPREHEN METABOLIC PANEL: CPT

## 2019-04-05 PROCEDURE — 96376 TX/PRO/DX INJ SAME DRUG ADON: CPT

## 2019-04-05 PROCEDURE — 02HA3RZ INSERTION OF SHORT-TERM EXTERNAL HEART ASSIST SYSTEM INTO HEART, PERCUTANEOUS APPROACH: ICD-10-PCS

## 2019-04-05 PROCEDURE — 82330 ASSAY OF CALCIUM: CPT

## 2019-04-05 PROCEDURE — 84484 ASSAY OF TROPONIN QUANT: CPT

## 2019-04-05 PROCEDURE — B2111ZZ FLUOROSCOPY OF MULTIPLE CORONARY ARTERIES USING LOW OSMOLAR CONTRAST: ICD-10-PCS

## 2019-04-05 PROCEDURE — 94002 VENT MGMT INPAT INIT DAY: CPT

## 2019-04-05 PROCEDURE — 96366 THER/PROPH/DIAG IV INF ADDON: CPT

## 2019-04-05 PROCEDURE — 4A023N7 MEASUREMENT OF CARDIAC SAMPLING AND PRESSURE, LEFT HEART, PERCUTANEOUS APPROACH: ICD-10-PCS

## 2019-04-05 PROCEDURE — 85730 THROMBOPLASTIN TIME PARTIAL: CPT

## 2019-04-05 PROCEDURE — 96365 THER/PROPH/DIAG IV INF INIT: CPT

## 2019-04-05 PROCEDURE — 96361 HYDRATE IV INFUSION ADD-ON: CPT

## 2019-04-05 PROCEDURE — 85610 PROTHROMBIN TIME: CPT

## 2019-04-05 PROCEDURE — 33210 INSERT ELECTRD/PM CATH SNGL: CPT

## 2019-04-05 PROCEDURE — 83735 ASSAY OF MAGNESIUM: CPT

## 2019-04-05 NOTE — CC
CARDIAC CATHETERIZATION REPORT



DATE OF SERVICE:

04/05/2019



PERFORMING PHYSICIAN:

Jorge A Elder MD, interventional cardiologist.



PROCEDURE PERFORMED:

1. Selective right and left coronary angiogram.

2. Left heart catheterization.

3. Selective angiogram of bilateral common femoral arteries.

4. Successful placement of the interlock 2.5 from the left femoral approach.

5. Successful placement of transvenous temporary pacemaker from right common femoral

    vein approach.



INDICATION:

This is a 64-year-old gentleman with history of coronary artery disease and prior

stenting of the right coronary artery in 2013 as well as hypertension, dyslipidemia,

diabetes and a significant history of smoking as well as noncompliance, where the

patient did not have any followup since 2013. He presented to the emergency room at

[facility] with chest discomfort and ruled in for acute non-ST-

elevation myocardial infarction.  He was seen and evaluated by Dr. Avendaño, who

recommended proceeding with coronary angiogram and possible coronary revascularization.

Because of that, the patient was brought to the cardiac cath lab.



APPROACH:

Right common femoral artery and left common femoral artery.



COMPLICATIONS:

None.



LEVEL OF SEDATION:

Initially we did conscious sedation, but throughout the procedure and because of the

cardiac arrest, we did induce general anesthesia and the patient was intubated.



PROCEDURE DESCRIPTION:

After obtaining informed consent, the patient was brought to the cardiac cath lab.  The

right common femoral artery was cannulated using micropuncture technique. The

micropuncture wire passed easily. Then I placed a 6-Macedonian sheath 11 cm in the left

common femoral artery.



At that point, I did selective right and left coronary angiogram using JR4 and JL4

catheters.  Left heart catheterization was performed using the JR4 catheter, which

crossed the aortic valve.  Then I did flush the catheter and we did left ventricular

end-diastolic pressure through the JR4 catheter.  After that, I did pullback across

aortic valve.



Subsequently we reviewed the angiogram and decided to place Impella in the left

ventricle because the patient was in cardiogenic shock with a systolic pressure in the

80s and mean pressure in the 50s.



Please refer to the Impella description throughout this report.



SELECTIVE CORONARY ANGIOGRAM:

RIGHT CORONARY ARTERY: The right coronary artery is a large-caliber vessel and it is a

dominant vessel.  The RCA in the proximal portion appeared to have mild disease only.

The RCA in the mid portion is stented.  The RCA is chronically occluded in the mid

portion, which seems to be in-stent occlusion and fills by bridging collaterals from

the left coronary system.



LEFT MAIN: The left main is angiographically normal.  It bifurcates into left

circumflex and left anterior descending artery.



LEFT CIRCUMFLEX: The left circumflex is a large-caliber vessel.  It is a nondominant

vessel.  The proximal left circumflex appeared to be angiographically normal.  It gives

rise to the first and second obtuse marginal branches.  The first obtuse marginal

branch is a medium-caliber vessel with ostial lesion that appeared to be in the range

of 99.9%.  The second obtuse marginal branch has another ostial lesion that appeared to

be in the range of 99.9% and also is a small- to medium-caliber vessel.  The mid left

circumflex gives rise to the third OM branch, which is a large-caliber vessel which has

a lesion in the mid portion that appeared to be in the range of 99.9%.  Bridging

collaterals from the left to right were seen.



LEFT ANTERIOR DESCENDING: The proximal LAD appeared to be hazy with a lesion that

appeared to be in the range of 95% to 99%. The LAD in the proximal portion gives rise

to the first diagonal branch, which is a large-caliber diagonal with intermediate

disease in the proximal portion.  The mid LAD after the first diagonal has another

lesion that appeared to be in the range of 95% to 99%. The LAD distally appeared to

have another lesion that appeared to be in the range of 70% to 80%.



HEMODYNAMICS:

The left ventricular end-diastolic pressure was about 22 mmHg without any significant

gradient across the aortic valve.



LEFT VENTRICULAR IMPELLA PLACEMENT:

Before I did access the left common femoral vein, I did selective right common femoral

artery angiogram which showed disease where the sheath enters the right common femoral

artery.  Because of that, I decided to do an angiogram of the left common femoral

artery to assess the status of the left common femoral artery before I placed a 14-

Macedonian sheath in it. So I did select the left common femoral artery using an 0.035

Glidewire with a 5-Macedonian RIM catheter.  After that I advanced the RIM catheter over

the Glidewire to the left external iliac artery, and I did selective left common

femoral artery angiogram with injection through the RIM catheter.  That identified

normal diameter without any obstructive disease in the left common femoral artery.  At

that point, I decided to place the Impella through the left common femoral artery.



I did access the left common femoral artery using micropuncture technique. The

micropuncture wire passed easily. Then I advanced the micropuncture sheath to the left

common femoral artery.  Then I did selective left common femoral artery angiogram

through the micropuncture sheath to assess the sheath entry, which was very appropriate

in the mid of the femoral head.  At that point, I decided to upgrade to 14-Macedonian

sheath.



I did placed at that point a 6-Macedonian 11 cm sheath in the left common femoral artery.

After that I placed 2 Perclose devices at 10 o'clock and 2 o'clock in the left common

femoral artery as a _____. Subsequently I did upgrade my 6-Macedonian sheath to 14-Macedonian

sheath with predilatation using an 8-Macedonian dilator, 10-Macedonian dilator, and a 12-Macedonian

dilator.  That was placed over a stiff 0.035 wire.



At that point, we gave the patient 3000 units of heparin IV to achieve an ACT above 250

after the 14-Macedonian sheath was placed in the left common femoral artery.



After that I crossed the left ventricle using a 6-Macedonian pigtail catheter with an 0.035

Glidewire.  After that I advanced the 0.018 Impella wire through the 6-Macedonian pigtail

catheter.  I pulled the 6-Macedonian pigtail catheter and left the Impella wire in the left

ventricle.  After that I advanced the Impella to the left ventricle under fluoroscopic

guidance.  We started the Impella pump at that point.



CATH LAB COURSE:

Throughout the procedure, the patient initially went into asystole.  We gave the

patient 2 mg of epinephrine and the patient came back to normal sinus mechanism.  At

that point I did place transvenous temporary pacemaker from the right common femoral

vein and that was performed under fluoroscopic guidance.  Subsequently the patient went

into ventricular fibrillation about 6 times, and he was shocked 6 times.  Throughout

that course he was given a total of 6 mg of epinephrine, 2 mg of magnesium, 2 amps of

calcium gluconate, and 2 amps of bicarb.  I also gave the patient 150 mg of amiodarone

IV.  We sent for STAT blood work.



Also at the beginning of the cardiac arrest, the patient was intubated and we called

CODE BLUE overhead.  The patient was intubated by the ER physician.  He was sedated at

that point as well because he was agitated before that.



By the end, we were able to achieve good blood pressure with a good pressure waveform

and paced rhythm, and decision was made to transfer the patient to a tertiary specialty

at [facility].



CONCLUSION:

1. Severe triple-vessel coronary artery disease as described above.

2. Cardiogenic shock.

3. Placement of Impella in the left ventricle.

4. Placement of transvenous temporary pacemaker in the right ventricle.



POST-PROCEDURE MANAGEMENT:

1. Continue support with Impella.

2. Transfer the patient to [facility].





MMODL / IJN: [id number] / Job#: [id number]

## 2019-04-05 NOTE — P.CRDCN
History of Present Illness


History of present illness: 





This is Dr. Avendaño dictating a consult on this patient


The patient was interviewed and examined by me





IMPRESSION / ASSESSMENT: 


Severe chest discomfort that woke the patient up at 2 AM in the morning


Acute myocardial injury with a significant elevation in troponin of 76 upon 

admission and a further rise to 93


Recurrent chest discomfort similar in character but milder in intensity ongoing 

for a very long time.  Last episode of chest discomfort with exertion about 2-3 

days back


Uncontrolled diabetes hemoglobin A1c 9.0


Patient continues to smoke 1 pack sig as a day


Past history of acute myocardial infarction 2013 status post coronary stenting 

at that time


Hyperkalemia on initial evaluation of 5.8, treated, repeat potassium of 5.0


BUN 20 creatinine 1.0


 and 404





Recurrent myocardial infarction with high troponins


Twelve-lead ECG shows paucity of any ST-T segment changes


Right bundle-branch block with prolonged IL interval


Loss of R waves in V4-V6 as well as one in aVL consistent with an old MI





PLAN:


After treatment of hyperkalemia, proceed with coronary angiography and further 

management based upon the results of the coronary angiogram


Aspirin atorvastatin fluids low dose metoprolol Nitropaste and heparin





HPI


Patient presented with severe chest discomfort that was excruciating, started 2 

AM in the morning


Delayed presentation to the ER


Recurrent symptoms of a mild nature but is exactly the same character for a long

period of time and the most recent episode about 3 days back.  At that time the 

discomfort in the chest was exertional


Patient did not seek medical advice for his recurrent chest discomfort has been 

going on for a long time.  According to his wife he is at goal , and he 

treats himself and is very stubborn.  He continues to smoke


Apparently his diabetes is now much better controlled with a hemoglobin of 9 





ROS: 


No fever chills or rigors, 


no cough, phlegm or expectoration, 


no nausea, vomiting or diarrhea, 


no hematuria, dysuria, 


no musculoskeletal complaints, 


no strokes or seizures, 


no skin lesions.





EXAMINATION:


104/72 mmHg, pulse rate in the 70s, normal respirations


No JVD


No cardiac murmurs no S3 gallop


Breath sounds are reduced bilaterally no rhonchi no crackles


Abdomen is soft nontender


No lower extremity edema or trophic changes noted





REVIEW OF LABS, ECG & MEDICAL DATA


White count 15.7, hemoglobin 14.4, sodium normal, elevated potassium improved 

with treatment


BUN 20 creatinine 1.0





















































Past Medical History


Past Medical History: Coronary Artery Disease (CAD), Diabetes Mellitus, 

GERD/Reflux, Hyperlipidemia, Hypertension, Myocardial Infarction (MI), 

Osteoarthritis (OA), Sleep Apnea/CPAP/BIPAP


Additional Past Medical History / Comment(s): Hx. of kidney stone.


Last Myocardial Infarction Date:: 


History of Any Multi-Drug Resistant Organisms: None Reported


Past Surgical History: Heart Catheterization With Stent


Additional Past Surgical History / Comment(s): Bilateral cataract surgery, left 

heart catheterization with PCI.


Past Anesthesia/Blood Transfusion Reactions: No Reported Reaction


Date of Last Stent Placement:: 


Past Psychological History: No Psychological Hx Reported


Smoking Status: Current every day smoker (Patient smoked about a pack every day 

he smoked since he was a teenager, patient drinks on a regular basis and he had 

3 rum and Cokes yesterday.)


Past Alcohol Use History: Occasional


Past Drug Use History: None Reported





- Past Family History


  ** Mother


Family Medical History: Diabetes Mellitus (Mother is alive with history of 

diabetes mellitus type 2.)





  ** Father


Family Medical History: Cancer (Father  from lung cancer at the age of 68.)


Additional Family Medical History / Comment(s): Lung





  ** Brother(s)


Family Medical History: No Reported History (Patient has one brother.)





  ** Sister(s)


Family Medical History: Vascular Disorder (Patient has 2 sisters one with to 

brain aneurysm status post surgery.)





  ** Daughter(s)


Family Medical History: No Reported History (Patient has 2 daughters no major 

medical problems.)





  ** Son(s)


Family Medical History: No Reported History (Patient has one son no major 

medical problems.)





Medications and Allergies


                                Home Medications











 Medication  Instructions  Recorded  Confirmed  Type


 


Atorvastatin [Lipitor] 80 mg PO DAILY 18 History


 


Metoprolol Tartrate [Lopressor] 25 mg PO BID 18 History


 


Pioglitazone HCl [Actos] 15 mg PO DAILY 18 History


 


metFORMIN HCL [Glucophage] 500 mg PO DAILY 18 History


 


Aspirin 325 mg PO ONCE PRN 19 History


 


Aspirin EC [Ecotrin Low Dose] 81 mg PO DAILY 19 History








                                    Allergies











Allergy/AdvReac Type Severity Reaction Status Date / Time


 


amoxicillin Allergy  Swelling Verified 19 12:09














Physical Exam


Vitals: 


                                   Vital Signs











  Temp Pulse Resp BP Pulse Ox


 


 19 16:00   76  14  104/72  99


 


 19 15:30   77  14  105/71  99


 


 19 15:00   79  16  102/71  98


 


 19 14:53   73  16  102/71  99


 


 19 14:30   75  10 L  101/70  100


 


 19 14:00   74  8 L  111/75 


 


 19 13:31   78  16  111/75  98


 


 19 13:30   81  12  99/74  99


 


 19 13:00   75  12  109/78 


 


 19 12:30   70  16  119/81  100


 


 19 11:47  97.7 F  83  22  105/70  99








                                Intake and Output











 19





 06:59 14:59 22:59


 


Other:   


 


  Weight  81.647 kg 














Results





                                 19 11:55





                                 19 17:34


                                 Cardiac Enzymes











  19 Range/Units





  11:55 11:55 14:36 


 


AST  452 H    (17-59)  U/L


 


Troponin I   76.200 H*  93.400 H*  (0.000-0.034)  ng/mL














  19 Range/Units





  17:34 


 


AST  404 H  (17-59)  U/L


 


Troponin I   (0.000-0.034)  ng/mL








                                   Coagulation











  19 Range/Units





  11:55 


 


PT  9.9  (9.0-12.0)  sec


 


APTT  22.9  (22.0-30.0)  sec








                                       CBC











  19 Range/Units





  11:55 


 


WBC  15.7 H  (3.8-10.6)  k/uL


 


RBC  4.55  (4.30-5.90)  m/uL


 


Hgb  14.4  (13.0-17.5)  gm/dL


 


Hct  42.1  (39.0-53.0)  %


 


Plt Count  249  (150-450)  k/uL








                          Comprehensive Metabolic Panel











  19 Range/Units





  11:55 17:34 


 


Sodium  139  139  (137-145)  mmol/L


 


Potassium  5.8 H  5.0  (3.5-5.1)  mmol/L


 


Chloride  105  108 H  ()  mmol/L


 


Carbon Dioxide  21 L  22  (22-30)  mmol/L


 


BUN  19  20  (9-20)  mg/dL


 


Creatinine  1.11  1.07  (0.66-1.25)  mg/dL


 


Glucose  163 H  161 H  (74-99)  mg/dL


 


Calcium  10.5 H  9.7  (8.4-10.2)  mg/dL


 


AST  452 H  404 H  (17-59)  U/L


 


ALT  81 H  77 H  (21-72)  U/L


 


Alkaline Phosphatase  133 H  120  ()  U/L


 


Total Protein  7.4  6.6  (6.3-8.2)  g/dL


 


Albumin  4.3  3.7  (3.5-5.0)  g/dL








                               Current Medications











Generic Name Dose Route Start Last Admin





  Trade Name Freq  PRN Reason Stop Dose Admin


 


Aspirin  325 mg  19 09:00 





  Aspirin  PO  





  DAILY [facility]  


 


Heparin Sodium/Sodium Chloride  250 mls @ 9.798 mls/hr  19 13:00  19

13:01





  25,000 unit/ Sodium Chloride  IV   12 units/kg/hr





  .Q24H [facility]   9.798 mls/hr





    Administration





  Protocol  





  12 UNITS/KG/HR  


 


Metoprolol Tartrate  25 mg  19 21:00 





  Lopressor  PO  





  BID [facility]  


 


Nitroglycerin  1 inch  19 18:00 





  Nitro-Bid Oint  TOPICAL  





  Q6HR [facility]  


 


Nitroglycerin  0.4 mg  19 13:02 





  Nitrostat  SUBLINGUAL  





  Q5M PRN  





  Chest Pain  








                                Intake and Output











 19





 06:59 14:59 22:59


 


Other:   


 


  Weight  81.647 kg 








                                 Patient Weight











 19





 06:59


 


Weight 81.647 kg








                                        





                                 19 11:55 





                                 19 17:34

## 2019-04-05 NOTE — P.HPIM
History of Present Illness


H&P Date: 19


Chief Complaint: Non-ST elevation MI.





This is a 64-year-old  male one of Dr. Blu Jolly with a previous 

medical history significant for hypertension and hypertensive cardiovascular 

disease, hyperlipidemia, coronary artery disease status post left heart catheter

ization with PCI about a year ago after MI last year, history of degenerative 

disc disease of the cervical spine, patient presented to the emergency 

department at [facility] today because of the severe sharp chest pain 

located in the mid chest radiating to the back he was 10 out of 10 in intensity 

associated with increased shortness breath, patient woke him from sleep at 

around 2:00 in the morning with the pain he went back to bed and he ended up 

coming to the ER in the morning for evaluation, patient stated that he has been 

getting on and off chest discomfort not as bad as it was today in the morning 

and every time he eats a meal he developed to have a significant dysphagia with 

significant pain he ended up eventually throwing things up in order for him to 

feel better the pain radiates across his shoulder from left-to-right, patient 

was seen and evaluated in the ER EKG showed normal sinus rhythm with right 

bundle branch block without any evidence of acute of normalities, patient 

troponin surprisingly came back at 76, patient was started on heparin drip, as 

well as Nitro paste, IV fluid resuscitation, cardiology consultation for left 

heart catheterization today, I have spoken with  and I informed him 

about the patient and he will need to go to to the cath lab ASAP.





Review of Systems


Constitutional: Denies anorexia, Denies chronic headaches, Denies lethargy, 

Denies weakness, Denies weight gain, Denies weight loss


Eyes: bilateral blurred vision, denies bulging eye, denies decreased vision


Ears: deny: decreased hearing


Ears, nose, mouth and throat: Reports dysphagia, Denies neck lump, Denies 

swelling in throat, Denies sore throat


Cardiovascular: Reports chest pain, Reports decreased exercise tolerance, 

Reports dyspnea on exertion, Reports high blood pressure, Reports shortness of 

breath, Denies leg edema, Denies lightheadedness, Denies orthopnea, Denies 

palpitations, Denies phlebitis, Denies rapid heart beat, Denies syncope


Respiratory: Reports sleep apnea, Reports snoring, Denies congestion, Denies 

cough, Denies cough with sputum, Denies home oxygen, Denies wheezing


Gastrointestinal: Reports dyspepsia, Reports heartburn, Reports indigestion, 

Denies abdominal pain, Denies BRBPR, Denies coffee ground emesis, Denies 

constipation, Denies hematemesis, Denies hematochezia, Denies jaundice, Denies 

lactose intolerance, Denies loss of appetite, Denies melena, Denies nausea, 

Denies vomiting


Genitourinary: Reports nocturia, Denies dysuria


Musculoskeletal: Denies myalgias


Musculoskeletal: absent: ankle pain, ankle stiffness, ankle swelling, elbow 

pain, elbow stiffness, elbow swelling, foot pain, foot stiffness, foot swelling,

hand pain, hand stiffness, hand swelling, hip pain, hip stiffness, hip swelling,

knee pain, knee stiffness, knee swelling, shoulder pain, shoulder stiffness, 

shoulder swelling, wrist pain, wrist stiffness, wrist swelling


Integumentary: Denies pruritus, Denies rash


Neurological: Denies numbness, Denies weakness


Psychiatric: Denies anxiety, Denies depression


Endocrine: Denies fatigue, Denies weight change





Past Medical History


Past Medical History: Coronary Artery Disease (CAD), Diabetes Mellitus, 

GERD/Reflux, Hyperlipidemia, Hypertension, Myocardial Infarction (MI), 

Osteoarthritis (OA), Sleep Apnea/CPAP/BIPAP


Additional Past Medical History / Comment(s): Hx. of kidney stone.


Last Myocardial Infarction Date:: 


History of Any Multi-Drug Resistant Organisms: None Reported


Past Surgical History: Heart Catheterization With Stent


Additional Past Surgical History / Comment(s): Bilateral cataract surgery, left 

heart catheterization with PCI.


Past Anesthesia/Blood Transfusion Reactions: No Reported Reaction


Date of Last Stent Placement:: 


Past Psychological History: No Psychological Hx Reported


Smoking Status: Current every day smoker (Patient smoked about a pack every day 

he smoked since he was a teenager, patient drinks on a regular basis and he had 

3 rum and Cokes yesterday.)


Past Alcohol Use History: Occasional


Past Drug Use History: None Reported





- Past Family History


  ** Mother


Family Medical History: Diabetes Mellitus (Mother is alive with history of 

diabetes mellitus type 2.)





  ** Father


Family Medical History: Cancer (Father  from lung cancer at the age of 68.)


Additional Family Medical History / Comment(s): Lung





  ** Brother(s)


Family Medical History: No Reported History (Patient has one brother.)





  ** Sister(s)


Family Medical History: Vascular Disorder (Patient has 2 sisters one with to 

brain aneurysm status post surgery.)





  ** Daughter(s)


Family Medical History: No Reported History (Patient has 2 daughters no major 

medical problems.)





  ** Son(s)


Family Medical History: No Reported History (Patient has one son no major 

medical problems.)





Medications and Allergies


                                Home Medications











 Medication  Instructions  Recorded  Confirmed  Type


 


Atorvastatin [Lipitor] 80 mg PO DAILY 18 History


 


Metoprolol Tartrate [Lopressor] 25 mg PO BID 18 History


 


Pioglitazone HCl [Actos] 15 mg PO DAILY 18 History


 


metFORMIN HCL [Glucophage] 500 mg PO DAILY 18 History


 


Aspirin 325 mg PO ONCE PRN 19 History


 


Aspirin EC [Ecotrin Low Dose] 81 mg PO DAILY 19 History








                                    Allergies











Allergy/AdvReac Type Severity Reaction Status Date / Time


 


amoxicillin Allergy  Swelling Verified 19 12:09














Physical Exam


Vitals: 


                                   Vital Signs











  Temp Pulse Resp BP Pulse Ox


 


 19 13:31   78  16  111/75  98


 


 19 13:00   75  12  109/78 


 


 19 12:30   70  16  119/81  100


 


 19 11:47  97.7 F  83  22  105/70  99








                                Intake and Output











 19





 22:59 06:59 14:59


 


Other:   


 


  Weight   81.647 kg














- Constitutional


General appearance: average body habitus, mild distress





- EENT


Eyes: anicteric sclerae, EOMI, PERRLA, no ptosis, no scleral icterus, normal 

appearance


ENT: hearing grossly normal, NA/AT, normal oropharynx, no thrush


Ears: bilateral: normal





- Neck


Neck: no lymphadenopathy, normal ROM, no rigidity, no stridor, no thyromegaly


Carotids: bilateral: upstroke normal


Thyroid: bilateral: normal size





- Respiratory


Respiratory: bilateral: diminished, negative: dullness, rales, rhonchi, 

wheezing, prolonged expiration





- Cardiovascular


Rhythm: regular


Heart sounds: normal: S1, S2


Abnormal Heart Sounds: systolic murmur, no rub, no S3 Gallop, no S4 Gallop, no 

click





- Gastrointestinal


General gastrointestinal: normal bowel sounds, soft, no splenomegaly, no 

tenderness, no umbilical hernia, no ventral hernia





- Integumentary


Integumentary: normal, normal turgor





- Neurologic


Neurologic: CNII-XII intact





- Musculoskeletal


Musculoskeletal: gait normal, strength equal bilaterally





- Psychiatric


Psychiatric: A&O x's 3, appropriate affect, intact judgment & insight





Results


CBC & Chem 7: 


                                 19 20:51





                                 19 20:51


Labs: 


                  Abnormal Lab Results - Last 24 Hours (Table)











  19 Range/Units





  11:55 11:55 11:55 


 


WBC  15.7 H    (3.8-10.6)  k/uL


 


Neutrophils #  11.3 H    (1.3-7.7)  k/uL


 


Monocytes #  1.2 H    (0-1.0)  k/uL


 


Potassium   5.8 H   (3.5-5.1)  mmol/L


 


Carbon Dioxide   21 L   (22-30)  mmol/L


 


Glucose   163 H   (74-99)  mg/dL


 


Calcium   10.5 H   (8.4-10.2)  mg/dL


 


AST   452 H   (17-59)  U/L


 


ALT   81 H   (21-72)  U/L


 


Alkaline Phosphatase   133 H   ()  U/L


 


Troponin I    76.200 H*  (0.000-0.034)  ng/mL














Thrombosis Risk Factor Assmnt





- DVT/VTE Prophylaxis


DVT/VTE Prophylaxis: Pharmacologic Prophylaxis ordered, Mechanical Prophylaxis 

ordered





Assessment and Plan


Assessment: 





Assessment and plan:








1.  Non-ST elevation MI.  Continue heparin drip, continue aspirin 325 mg orally 

once every day, continue Lopressor 25 mg orally twice every day, continue Lipito

r 80 mg orally once every day, keep the patient nothing per mouth for cardiac 

evaluation left heart catheterization today,. Called  and discused 

with him the need for LHC.





2.  History of CAD post-PCI about a year ago.  Continue treatment as in 

paragraph #1.





3.  Hypertension and hypertensive cardiovascular disease.  Continue patient on 

Lopressor 25 mg orally twice every day.





4.  Hyperlipidemia.  Continue Lipitor 80 mg orally once every day.





5.  Diabetes mellitus type 2.  Continue patient on Actos 15 mg orally once every

day as well as sliding scale insulin.





6.  Reported dysphagia thought to be due to possible hiatal hernia and rule out 

esophageal stricture versus tumor.  Continue Protonix 40 mg orally once every 

day.





7.  Chronic tobacco use and dependence.  Smoking cessation and counseling an 

increased risk of CAD, CVA, and malignancy





8.  Chronic alcohol use.  Abstinence from alcohol.





9.  Underlying sleep apnea patient may need to go for sleep study as an 

outpatient.





10.  DVT prophylaxis.  Continue heparin drip.





11.  GI prophylaxis.  Continue Protonix 40 mg orally once every day.





12.  Admit to inpatient.  Estimated length of stay 2 midnights.





13.  Patient is full code.

## 2019-04-05 NOTE — XR
EXAMINATION TYPE: XR chest 2V

 

DATE OF EXAM: 4/5/2019

 

COMPARISON: 8/5/2018

 

TECHNIQUE: PA and lateral views submitted.

 

HISTORY: Chest pain

 

FINDINGS:

The lungs are clear and  there is no pneumothorax, pleural effusion, or focal pneumonia.  No overt fa
ilure. Hyperinflation the lungs correlate for COPD. Degenerative change of the spine noted.

 

IMPRESSION: 

1. No acute process.

## 2019-04-05 NOTE — ED
General Adult HPI





- General


Chief complaint: Chest Pain


Stated complaint: Chest Pain


Time Seen by Provider: 04/05/19 11:45


Source: patient, RN notes reviewed


Mode of arrival: ambulatory


Limitations: no limitations





- History of Present Illness


Initial comments: 





This is a 64-year-old male who has a past medical history significant for a 

heart attack and has one stent placed in his arteries.  Patient states he also 

has diabetes.  Patient states he has been having intermittent chest pain since 

2:00 this morning he's also had some related diaphoretic episodes as well as 

shortness of breath.  Patient states he also felt mildly lightheaded.  Patient 

denies radiation of the pain anywhere and he states currently the pain is only 

about a 2 but still there.  Patient states he has had similar episodes in the 

past but usually go away relatively quickly and have never lasted this long.  

Patient denies any palpitations.  Patient denies any abdominal pain.  Patient 

any back pain.  Patient denies headache patient denies any numbness weakness per

patient denies any eye tingling to the legs or calf tenderness.  Patient states 

he continues to smoke.





- Related Data


                                Home Medications











 Medication  Instructions  Recorded  Confirmed


 


Atorvastatin [Lipitor] 80 mg PO DAILY 08/05/18 04/05/19


 


Metoprolol Tartrate [Lopressor] 25 mg PO BID 08/05/18 04/05/19


 


Pioglitazone HCl [Actos] 15 mg PO DAILY 08/05/18 04/05/19


 


metFORMIN HCL [Glucophage] 500 mg PO DAILY 08/05/18 04/05/19


 


Aspirin 325 mg PO ONCE PRN 04/05/19 04/05/19


 


Aspirin EC [Ecotrin Low Dose] 81 mg PO DAILY 04/05/19 04/05/19











                                    Allergies











Allergy/AdvReac Type Severity Reaction Status Date / Time


 


amoxicillin Allergy  Swelling Verified 04/05/19 12:09














Review of Systems


ROS Statement: 


Those systems with pertinent positive or pertinent negative responses have been 

documented in the HPI.





ROS Other: All systems not noted in ROS Statement are negative.





Past Medical History


Past Medical History: GERD/Reflux, Myocardial Infarction (MI)


Additional Past Medical History / Comment(s): Hx. of kidney stone.


Last Myocardial Infarction Date:: 2012


History of Any Multi-Drug Resistant Organisms: None Reported


Past Surgical History: Heart Catheterization With Stent


Additional Past Surgical History / Comment(s): rt cataract surgery


Past Anesthesia/Blood Transfusion Reactions: No Reported Reaction


Date of Last Stent Placement:: 2012


Past Psychological History: No Psychological Hx Reported


Smoking Status: Current every day smoker


Past Alcohol Use History: Occasional


Past Drug Use History: None Reported





- Past Family History


  ** Mother


Family Medical History: No Reported History





  ** Father


Family Medical History: Cancer


Additional Family Medical History / Comment(s): Lung





  ** Brother(s)


Family Medical History: No Reported History (Patient has one brother.)





  ** Sister(s)


Family Medical History: Vascular Disorder (Patient has 2 sisters one with to 

brain aneurysm status post surgery.)





  ** Daughter(s)


Family Medical History: No Reported History (Patient has 2 daughters no major 

medical problems.)





  ** Son(s)


Family Medical History: No Reported History (Patient has one son no major 

medical problems.)





General Exam





- General Exam Comments


Initial Comments: 





GENERAL:


Patient is well-developed and well-nourished.  Patient is nontoxic and well-

hydrated and is in mild distress.





ENT:


Neck is soft and supple.  No significant lymphadenopathy is noted.  Oropharynx 

is clear.  Moist mucous membranes.  Neck has full range of motion without 

eliciting any pain.





EYES:


The sclera were anicteric and conjunctiva were pink and moist.  Extraocular 

movements were intact and pupils were equal round and reactive to light.  

Eyelids were unremarkable.





PULMONARY:


Unlabored respirations.  Good breath sounds bilaterally.  No audible rales 

rhonchi or wheezing was noted.





CARDIOVASCULAR:


There is a regular rate and rhythm without any murmurs gallops or rubs.





ABDOMEN:


Soft and nontender with normal bowel sounds.  





SKIN:


Skin is clear with no lesions or rashes and otherwise unremarkable.





NEUROLOGIC:


Patient is alert and oriented x3.  Cranial nerves II through XII are grossly int

act.  Motor and sensory are also intact.  Normal speech, volume and content.  

Symmetrical smile.  





MUSCULOSKELETAL:


Normal extremities with adequate strength and full range of motion.  No lower 

extremity swelling or edema.  No calf tenderness.





LYMPHATICS:


No significant lymphadenopathy is noted





PSYCHIATRIC:


Normal psychiatric evaluation.  


Limitations: no limitations





Course


                                   Vital Signs











  04/05/19 04/05/19 04/05/19





  11:47 12:30 13:00


 


Temperature 97.7 F  


 


Pulse Rate 83 70 75


 


Respiratory 22 16 12





Rate   


 


Blood Pressure 105/70 119/81 109/78


 


O2 Sat by Pulse 99 100 





Oximetry   














  04/05/19 04/05/19 04/05/19





  13:30 13:31 14:00


 


Temperature   


 


Pulse Rate 81 78 74


 


Respiratory 12 16 8 L





Rate   


 


Blood Pressure 99/74 111/75 111/75


 


O2 Sat by Pulse 99 98 





Oximetry   














  04/05/19 04/05/19 04/05/19





  14:30 14:53 15:00


 


Temperature   


 


Pulse Rate 75 73 79


 


Respiratory 10 L 16 16





Rate   


 


Blood Pressure 101/70 102/71 102/71


 


O2 Sat by Pulse 100 99 98





Oximetry   














  04/05/19 04/05/19





  15:30 16:00


 


Temperature  


 


Pulse Rate 77 76


 


Respiratory 14 14





Rate  


 


Blood Pressure 105/71 104/72


 


O2 Sat by Pulse 99 99





Oximetry  














Medical Decision Making





- Medical Decision Making





EKG shows sinus rhythm at 81 bpm OR interval is 210 QRS is 124 QTC is 450 QT is 

388.  Patient's EKG has a right bundle vee block.  There are no acute changes

when compared to an old EKG.





Chest x-ray shows no acute abnormality.





Patient's troponin came back at 76.





I spoke with cardiology.





I started the patient on heparin I held any nitroglycerin drip because the 

patient's pain was 2 out of 10 and his blood pressure systolically was 105.  I 

repeated the troponin.





I spoke with Dr. Sanchez he agreed to admit the patient admitted the patient nathan [email or web address].





- Lab Data


Result diagrams: 


                                 04/05/19 20:51





                                 04/05/19 20:51


                                   Lab Results











  04/05/19 04/05/19 04/05/19 Range/Units





  11:55 11:55 11:55 


 


WBC  15.7 H    (3.8-10.6)  k/uL


 


RBC  4.55    (4.30-5.90)  m/uL


 


Hgb  14.4    (13.0-17.5)  gm/dL


 


Hct  42.1    (39.0-53.0)  %


 


MCV  92.6    (80.0-100.0)  fL


 


MCH  31.6    (25.0-35.0)  pg


 


MCHC  34.1    (31.0-37.0)  g/dL


 


RDW  14.1    (11.5-15.5)  %


 


Plt Count  249    (150-450)  k/uL


 


Neutrophils %  72    %


 


Lymphocytes %  17    %


 


Monocytes %  8    %


 


Eosinophils %  1    %


 


Basophils %  1    %


 


Neutrophils #  11.3 H    (1.3-7.7)  k/uL


 


Lymphocytes #  2.6    (1.0-4.8)  k/uL


 


Monocytes #  1.2 H    (0-1.0)  k/uL


 


Eosinophils #  0.1    (0-0.7)  k/uL


 


Basophils #  0.1    (0-0.2)  k/uL


 


PT    9.9  (9.0-12.0)  sec


 


INR    0.9  (<1.2)  


 


APTT    22.9  (22.0-30.0)  sec


 


Sodium   139   (137-145)  mmol/L


 


Potassium   5.8 H   (3.5-5.1)  mmol/L


 


Chloride   105   ()  mmol/L


 


Carbon Dioxide   21 L   (22-30)  mmol/L


 


Anion Gap   13   mmol/L


 


BUN   19   (9-20)  mg/dL


 


Creatinine   1.11   (0.66-1.25)  mg/dL


 


Est GFR (CKD-EPI)AfAm   82   (>60 ml/min/1.73 sqM)  


 


Est GFR (CKD-EPI)NonAf   71   (>60 ml/min/1.73 sqM)  


 


Glucose   163 H   (74-99)  mg/dL


 


Calcium   10.5 H   (8.4-10.2)  mg/dL


 


Magnesium   1.9   (1.6-2.3)  mg/dL


 


Total Bilirubin   0.7   (0.2-1.3)  mg/dL


 


AST   452 H   (17-59)  U/L


 


ALT   81 H   (21-72)  U/L


 


Alkaline Phosphatase   133 H   ()  U/L


 


Troponin I     (0.000-0.034)  ng/mL


 


Total Protein   7.4   (6.3-8.2)  g/dL


 


Albumin   4.3   (3.5-5.0)  g/dL














  04/05/19 Range/Units





  11:55 


 


WBC   (3.8-10.6)  k/uL


 


RBC   (4.30-5.90)  m/uL


 


Hgb   (13.0-17.5)  gm/dL


 


Hct   (39.0-53.0)  %


 


MCV   (80.0-100.0)  fL


 


MCH   (25.0-35.0)  pg


 


MCHC   (31.0-37.0)  g/dL


 


RDW   (11.5-15.5)  %


 


Plt Count   (150-450)  k/uL


 


Neutrophils %   %


 


Lymphocytes %   %


 


Monocytes %   %


 


Eosinophils %   %


 


Basophils %   %


 


Neutrophils #   (1.3-7.7)  k/uL


 


Lymphocytes #   (1.0-4.8)  k/uL


 


Monocytes #   (0-1.0)  k/uL


 


Eosinophils #   (0-0.7)  k/uL


 


Basophils #   (0-0.2)  k/uL


 


PT   (9.0-12.0)  sec


 


INR   (<1.2)  


 


APTT   (22.0-30.0)  sec


 


Sodium   (137-145)  mmol/L


 


Potassium   (3.5-5.1)  mmol/L


 


Chloride   ()  mmol/L


 


Carbon Dioxide   (22-30)  mmol/L


 


Anion Gap   mmol/L


 


BUN   (9-20)  mg/dL


 


Creatinine   (0.66-1.25)  mg/dL


 


Est GFR (CKD-EPI)AfAm   (>60 ml/min/1.73 sqM)  


 


Est GFR (CKD-EPI)NonAf   (>60 ml/min/1.73 sqM)  


 


Glucose   (74-99)  mg/dL


 


Calcium   (8.4-10.2)  mg/dL


 


Magnesium   (1.6-2.3)  mg/dL


 


Total Bilirubin   (0.2-1.3)  mg/dL


 


AST   (17-59)  U/L


 


ALT   (21-72)  U/L


 


Alkaline Phosphatase   ()  U/L


 


Troponin I  76.200 H*  (0.000-0.034)  ng/mL


 


Total Protein   (6.3-8.2)  g/dL


 


Albumin   (3.5-5.0)  g/dL














Critical Care Time


Critical Care Time: Yes


Total Critical Care Time: 35





Disposition


Clinical Impression: 


 Non-STEMI (non-ST elevated myocardial infarction)





Disposition: ADMITTED AS IP TO THIS [facility]


Time of Disposition: 13:02

## 2019-04-05 NOTE — P.PCN
Preoperative Diagnosis: 


Diagnosis


Complete heart block during placement of impella device


Dyspareunia enlargement of the TVP placed via the femoral vein


Previously right bundle branch block mild first-degree AV block





Acute myocardial infarction, delayed presentation


Recurrent myocardial infarction over the last week with repeated episodes of 

chest pain


Patient has had exertional angina for the last 2 years.  Chest pain was mild


This time the chest pain was extremely severe and woke the patient up from sleep

at 2 AM yesterday morning


He presented to the ER this afternoon


Cardiac catheterization revealed multivessel coronary artery disease, 

chronically occluded RCA at site of prior stent was placed in 2013


High LVEDP





Procedure


Externalized temporary permanent pacemaker, left pectoral





Details


Under.  Precautions, IV antibiotics, left axillary vein access was obtained with

a micropuncture needle and the wire was placed in the right side of the heart.  

Via and appropriately sized introducer sheath a [employer] medical screw-in 

lead, 58 cm was placed in the right ventricle


Pacing threshold 0.8 V at 0.5 ms pacing impedance 738 ohms, 10 V test negative





Pacemaker programmed to VVI 70 PPM with high output 5 V at 1.0 ms


Lead secured to the skin


Device secured to the skin





Temporary pacemaker wire from the right femoral vein removed





Plan


Continue pressors


Continue management with impella device


Temporary pacing via the externalized pacemaker, left pectoral

## 2019-04-06 LAB
MAGNESIUM SPEC-SCNC: 6.6 MG/DL (ref 1.6–2.3)
POTASSIUM SERPL-SCNC: 3.3 MMOL/L (ref 3.5–5.1)